# Patient Record
Sex: FEMALE | Race: WHITE | Employment: OTHER | URBAN - METROPOLITAN AREA
[De-identification: names, ages, dates, MRNs, and addresses within clinical notes are randomized per-mention and may not be internally consistent; named-entity substitution may affect disease eponyms.]

---

## 2017-01-13 PROCEDURE — 87077 CULTURE AEROBIC IDENTIFY: CPT | Performed by: FAMILY MEDICINE

## 2017-01-13 PROCEDURE — 87186 SC STD MICRODIL/AGAR DIL: CPT | Performed by: FAMILY MEDICINE

## 2017-01-13 PROCEDURE — 87086 URINE CULTURE/COLONY COUNT: CPT | Performed by: FAMILY MEDICINE

## 2017-04-19 ENCOUNTER — OFFICE VISIT (OUTPATIENT)
Dept: SURGERY | Facility: CLINIC | Age: 77
End: 2017-04-19

## 2017-04-19 VITALS
WEIGHT: 155 LBS | RESPIRATION RATE: 14 BRPM | BODY MASS INDEX: 28.52 KG/M2 | SYSTOLIC BLOOD PRESSURE: 144 MMHG | DIASTOLIC BLOOD PRESSURE: 82 MMHG | HEIGHT: 62 IN | HEART RATE: 81 BPM

## 2017-04-19 DIAGNOSIS — K64.3 PROLAPSED INTERNAL HEMORRHOIDS, GRADE 4: Primary | ICD-10-CM

## 2017-04-19 PROCEDURE — 99244 OFF/OP CNSLTJ NEW/EST MOD 40: CPT | Performed by: SURGERY

## 2017-04-19 NOTE — H&P
New Patient Visit Note       Active Problems      No diagnosis found.     Chief Complaint   Patient presents with:  Hemorrhoids: NW PT ref by  for hemorrhoid      History of Present Illness     Patient presents with long-standing history of hemorrhoidal d Other Sister    • Heart Disorder Brother    • Hypertension Brother      Social History    Marital Status:              Spouse Name:                       Years of Education:                 Number of children:               Social History Main Topic ASPIRIN 81 MG OR TABS 1 TABLET DAILY Disp:  Rfl:    CRAN-MAX OR 1 TABLET DAILY Disp:  Rfl:    MAGNESIUM 250 MG OR TABS 1 TABLET TWICE DAILY Disp:  Rfl:    FOLIC ACID 699 MCG OR TABS 1 TABLET DAILY Disp:  Rfl:    B-12 1000 MCG OR TBCR 1 TABLET DAILY Disp: wheezes. Gastrointestinal.  Abdomen soft, non tender, non distended. No masses, hernias, or organomegaly noted. Musculoskeletal. No muscle wasting. No gross deformities. Normal strength. Neurologic. Patient alert and oriented x 3.   Cranial nerves 2-

## 2017-04-21 RX ORDER — POLYETHYLENE GLYCOL 3350, SODIUM CHLORIDE, SODIUM BICARBONATE, POTASSIUM CHLORIDE 420; 11.2; 5.72; 1.48 G/4L; G/4L; G/4L; G/4L
POWDER, FOR SOLUTION ORAL
Qty: 1 BOTTLE | Refills: 0 | Status: SHIPPED | OUTPATIENT
Start: 2017-04-21 | End: 2018-04-03

## 2017-05-08 ENCOUNTER — TELEPHONE (OUTPATIENT)
Dept: FAMILY MEDICINE CLINIC | Facility: CLINIC | Age: 77
End: 2017-05-08

## 2017-05-08 NOTE — TELEPHONE ENCOUNTER
----- Message from Randi Mora DO sent at 5/5/2017  5:38 PM CDT -----  This is the pt that needs hemorrhoidectomy possible pph can you call her and see if she wants to schedule?      Tory Bran

## 2017-05-08 NOTE — TELEPHONE ENCOUNTER
----- Message from Devyn Li sent at 5/8/2017 11:48 AM CDT -----  Albina Clay-   Please call after 2:00 on her cell [phone]. Pt states she doesn't want to Jeopardize her cruise in September and she doesn't want to the surgery right now.       Pt stat

## 2017-06-01 NOTE — TELEPHONE ENCOUNTER
----- Message from Quita Guerrero DO sent at 6/1/2017 10:07 AM CDT -----  IF this pt calls back to schedule please give me a heads up that shes on the on the schedule will need Levi Webber to assist

## 2018-06-23 ENCOUNTER — CHARTING TRANS (OUTPATIENT)
Dept: OTHER | Age: 78
End: 2018-06-23

## 2018-08-24 ENCOUNTER — CHARTING TRANS (OUTPATIENT)
Dept: OTHER | Age: 78
End: 2018-08-24

## 2018-09-18 PROCEDURE — 88305 TISSUE EXAM BY PATHOLOGIST: CPT | Performed by: RADIOLOGY

## 2018-10-15 ENCOUNTER — CHARTING TRANS (OUTPATIENT)
Dept: OTHER | Age: 78
End: 2018-10-15

## 2020-10-29 PROBLEM — I51.89 DIASTOLIC DYSFUNCTION: Status: ACTIVE | Noted: 2020-10-29

## 2025-08-06 RX ORDER — GINSENG 100 MG
1 CAPSULE ORAL DAILY
COMMUNITY

## 2025-08-06 RX ORDER — MULTIVITAMIN WITH IRON
250 TABLET ORAL DAILY
COMMUNITY

## 2025-08-06 RX ORDER — METOPROLOL TARTRATE 100 MG/1
100 TABLET ORAL 2 TIMES DAILY
COMMUNITY
End: 2025-08-12

## 2025-08-06 RX ORDER — AMIODARONE HYDROCHLORIDE 200 MG/1
200 TABLET ORAL 2 TIMES DAILY
Status: ON HOLD | COMMUNITY
End: 2025-08-12

## 2025-08-12 ENCOUNTER — HOSPITAL ENCOUNTER (OUTPATIENT)
Dept: INTERVENTIONAL RADIOLOGY/VASCULAR | Facility: HOSPITAL | Age: 85
Discharge: HOME | End: 2025-08-12
Attending: INTERNAL MEDICINE | Admitting: INTERNAL MEDICINE

## 2025-08-12 VITALS
DIASTOLIC BLOOD PRESSURE: 73 MMHG | TEMPERATURE: 98 F | SYSTOLIC BLOOD PRESSURE: 117 MMHG | BODY MASS INDEX: 30.96 KG/M2 | WEIGHT: 164 LBS | OXYGEN SATURATION: 94 % | HEART RATE: 50 BPM | RESPIRATION RATE: 23 BRPM | HEIGHT: 61 IN

## 2025-08-12 DIAGNOSIS — I48.91 A-FIB (HCC): ICD-10-CM

## 2025-08-12 PROCEDURE — 93010 ELECTROCARDIOGRAM REPORT: CPT | Performed by: INTERNAL MEDICINE

## 2025-08-12 PROCEDURE — 92960 CARDIOVERSION ELECTRIC EXT: CPT | Performed by: INTERNAL MEDICINE

## 2025-08-12 PROCEDURE — 93005 ELECTROCARDIOGRAM TRACING: CPT

## 2025-08-12 RX ORDER — METHOHEXITAL IN WATER/PF 100MG/10ML
100 SYRINGE (ML) INTRAVENOUS ONCE
Status: DISCONTINUED | OUTPATIENT
Start: 2025-08-12 | End: 2025-08-12

## 2025-08-12 RX ORDER — AMIODARONE HYDROCHLORIDE 200 MG/1
200 TABLET ORAL DAILY
Qty: 30 TABLET | Refills: 2 | Status: SHIPPED | OUTPATIENT
Start: 2025-08-12

## 2025-08-12 RX ORDER — METHOHEXITAL IN WATER/PF 100MG/10ML
SYRINGE (ML) INTRAVENOUS
Status: COMPLETED
Start: 2025-08-12 | End: 2025-08-12

## 2025-08-12 RX ORDER — SODIUM CHLORIDE 9 MG/ML
INJECTION, SOLUTION INTRAVENOUS CONTINUOUS
Status: DISCONTINUED | OUTPATIENT
Start: 2025-08-12 | End: 2025-08-12

## 2025-08-13 LAB
ATRIAL RATE: 61 BPM
P AXIS: 74 DEGREES
P-R INTERVAL: 256 MS
Q-T INTERVAL: 518 MS
QRS DURATION: 96 MS
QTC CALCULATION (BEZET): 521 MS
R AXIS: -20 DEGREES
T AXIS: 68 DEGREES
VENTRICULAR RATE: 61 BPM

## (undated) NOTE — Clinical Note
Tootie Lyons saw Glen Jovita in the office today. She presents with grade 4 hemorrhoidal disease with early rectal prolapse. She will require hemorrhoidectomy.   I am scheduling her at [facility].  Thank you Annmarie Fay

## (undated) NOTE — MR AVS SNAPSHOT
[facility]  [address] [address] [phone]               Thank you for choosing us for your health care visit with Aaliyah Martínez DO.   We are glad to serve you and happy to provide you with this summary of yo Fluticasone Propionate 50 MCG/ACT Susp   2 sprays by Nasal route daily as needed.    Commonly known as:  FLONASE           folic acid 939 MCG Tabs   1 TABLET DAILY   Commonly known as:  FOLVITE           guaiFENesin-codeine 100-10 MG/5ML Soln   Take 5 mL b Stacey                  Visit [facility] online at  [employer]tn